# Patient Record
Sex: FEMALE | Race: WHITE
[De-identification: names, ages, dates, MRNs, and addresses within clinical notes are randomized per-mention and may not be internally consistent; named-entity substitution may affect disease eponyms.]

---

## 2018-01-23 ENCOUNTER — HOSPITAL ENCOUNTER (OUTPATIENT)
Dept: HOSPITAL 17 - NEPE | Age: 68
Setting detail: OBSERVATION
Discharge: HOME | End: 2018-01-23
Attending: HOSPITALIST | Admitting: HOSPITALIST
Payer: MEDICARE

## 2018-01-23 VITALS
RESPIRATION RATE: 16 BRPM | HEART RATE: 102 BPM | DIASTOLIC BLOOD PRESSURE: 60 MMHG | OXYGEN SATURATION: 98 % | SYSTOLIC BLOOD PRESSURE: 101 MMHG

## 2018-01-23 VITALS
RESPIRATION RATE: 16 BRPM | OXYGEN SATURATION: 97 % | SYSTOLIC BLOOD PRESSURE: 113 MMHG | HEART RATE: 102 BPM | DIASTOLIC BLOOD PRESSURE: 53 MMHG

## 2018-01-23 VITALS
HEART RATE: 135 BPM | RESPIRATION RATE: 18 BRPM | DIASTOLIC BLOOD PRESSURE: 57 MMHG | SYSTOLIC BLOOD PRESSURE: 128 MMHG | OXYGEN SATURATION: 98 %

## 2018-01-23 VITALS — DIASTOLIC BLOOD PRESSURE: 55 MMHG | SYSTOLIC BLOOD PRESSURE: 94 MMHG

## 2018-01-23 VITALS
RESPIRATION RATE: 16 BRPM | TEMPERATURE: 98.7 F | SYSTOLIC BLOOD PRESSURE: 98 MMHG | DIASTOLIC BLOOD PRESSURE: 53 MMHG | HEART RATE: 54 BPM | OXYGEN SATURATION: 95 %

## 2018-01-23 VITALS — HEART RATE: 54 BPM

## 2018-01-23 VITALS
DIASTOLIC BLOOD PRESSURE: 52 MMHG | RESPIRATION RATE: 18 BRPM | HEART RATE: 63 BPM | SYSTOLIC BLOOD PRESSURE: 112 MMHG | TEMPERATURE: 98.3 F | OXYGEN SATURATION: 96 %

## 2018-01-23 VITALS — WEIGHT: 180.78 LBS | HEIGHT: 66 IN | BODY MASS INDEX: 29.05 KG/M2

## 2018-01-23 VITALS
SYSTOLIC BLOOD PRESSURE: 95 MMHG | RESPIRATION RATE: 18 BRPM | HEART RATE: 102 BPM | DIASTOLIC BLOOD PRESSURE: 67 MMHG | OXYGEN SATURATION: 98 %

## 2018-01-23 VITALS
OXYGEN SATURATION: 97 % | DIASTOLIC BLOOD PRESSURE: 58 MMHG | RESPIRATION RATE: 16 BRPM | HEART RATE: 66 BPM | TEMPERATURE: 98.9 F | SYSTOLIC BLOOD PRESSURE: 125 MMHG

## 2018-01-23 VITALS
HEART RATE: 148 BPM | RESPIRATION RATE: 15 BRPM | DIASTOLIC BLOOD PRESSURE: 67 MMHG | SYSTOLIC BLOOD PRESSURE: 112 MMHG | TEMPERATURE: 97.8 F

## 2018-01-23 VITALS — OXYGEN SATURATION: 96 % | RESPIRATION RATE: 20 BRPM | HEART RATE: 152 BPM

## 2018-01-23 VITALS — DIASTOLIC BLOOD PRESSURE: 50 MMHG | SYSTOLIC BLOOD PRESSURE: 95 MMHG

## 2018-01-23 VITALS
HEART RATE: 113 BPM | RESPIRATION RATE: 19 BRPM | SYSTOLIC BLOOD PRESSURE: 106 MMHG | DIASTOLIC BLOOD PRESSURE: 53 MMHG | OXYGEN SATURATION: 98 %

## 2018-01-23 VITALS
HEART RATE: 88 BPM | OXYGEN SATURATION: 98 % | RESPIRATION RATE: 15 BRPM | DIASTOLIC BLOOD PRESSURE: 54 MMHG | SYSTOLIC BLOOD PRESSURE: 89 MMHG

## 2018-01-23 VITALS — HEART RATE: 60 BPM

## 2018-01-23 VITALS — DIASTOLIC BLOOD PRESSURE: 60 MMHG | SYSTOLIC BLOOD PRESSURE: 92 MMHG

## 2018-01-23 VITALS — OXYGEN SATURATION: 98 % | RESPIRATION RATE: 15 BRPM

## 2018-01-23 DIAGNOSIS — N17.9: ICD-10-CM

## 2018-01-23 DIAGNOSIS — I10: ICD-10-CM

## 2018-01-23 DIAGNOSIS — I48.0: Primary | ICD-10-CM

## 2018-01-23 DIAGNOSIS — J39.8: ICD-10-CM

## 2018-01-23 DIAGNOSIS — I25.10: ICD-10-CM

## 2018-01-23 DIAGNOSIS — I62.9: ICD-10-CM

## 2018-01-23 LAB
BASOPHILS # BLD AUTO: 0.1 TH/MM3 (ref 0–0.2)
BASOPHILS NFR BLD: 0.8 % (ref 0–2)
BUN SERPL-MCNC: 18 MG/DL (ref 7–18)
CALCIUM SERPL-MCNC: 8.3 MG/DL (ref 8.5–10.1)
CHLORIDE SERPL-SCNC: 107 MEQ/L (ref 98–107)
CREAT SERPL-MCNC: 1.05 MG/DL (ref 0.5–1)
EOSINOPHIL # BLD: 0 TH/MM3 (ref 0–0.4)
EOSINOPHIL NFR BLD: 0.6 % (ref 0–4)
ERYTHROCYTE [DISTWIDTH] IN BLOOD BY AUTOMATED COUNT: 13.2 % (ref 11.6–17.2)
GFR SERPLBLD BASED ON 1.73 SQ M-ARVRAT: 52 ML/MIN (ref 89–?)
GLUCOSE SERPL-MCNC: 130 MG/DL (ref 74–106)
HCO3 BLD-SCNC: 20.7 MEQ/L (ref 21–32)
HCT VFR BLD CALC: 40 % (ref 35–46)
HGB BLD-MCNC: 14.3 GM/DL (ref 11.6–15.3)
INR PPP: 1.1 RATIO
LYMPHOCYTES # BLD AUTO: 1.9 TH/MM3 (ref 1–4.8)
LYMPHOCYTES NFR BLD AUTO: 29.4 % (ref 9–44)
MAGNESIUM SERPL-MCNC: 2.1 MG/DL (ref 1.5–2.5)
MCH RBC QN AUTO: 31.1 PG (ref 27–34)
MCHC RBC AUTO-ENTMCNC: 35.7 % (ref 32–36)
MCV RBC AUTO: 87.1 FL (ref 80–100)
MONOCYTE #: 0.9 TH/MM3 (ref 0–0.9)
MONOCYTES NFR BLD: 14.5 % (ref 0–8)
NEUTROPHILS # BLD AUTO: 3.5 TH/MM3 (ref 1.8–7.7)
NEUTROPHILS NFR BLD AUTO: 54.7 % (ref 16–70)
PLATELET # BLD: 288 TH/MM3 (ref 150–450)
PMV BLD AUTO: 7.6 FL (ref 7–11)
PROTHROMBIN TIME: 10.7 SEC (ref 9.8–11.6)
RBC # BLD AUTO: 4.59 MIL/MM3 (ref 4–5.3)
SODIUM SERPL-SCNC: 137 MEQ/L (ref 136–145)
TROPONIN I SERPL-MCNC: (no result) NG/ML (ref 0.02–0.05)
WBC # BLD AUTO: 6.4 TH/MM3 (ref 4–11)

## 2018-01-23 PROCEDURE — 84443 ASSAY THYROID STIM HORMONE: CPT

## 2018-01-23 PROCEDURE — 83735 ASSAY OF MAGNESIUM: CPT

## 2018-01-23 PROCEDURE — 85610 PROTHROMBIN TIME: CPT

## 2018-01-23 PROCEDURE — 96376 TX/PRO/DX INJ SAME DRUG ADON: CPT

## 2018-01-23 PROCEDURE — 82552 ASSAY OF CPK IN BLOOD: CPT

## 2018-01-23 PROCEDURE — 85730 THROMBOPLASTIN TIME PARTIAL: CPT

## 2018-01-23 PROCEDURE — 84484 ASSAY OF TROPONIN QUANT: CPT

## 2018-01-23 PROCEDURE — 85025 COMPLETE CBC W/AUTO DIFF WBC: CPT

## 2018-01-23 PROCEDURE — 96374 THER/PROPH/DIAG INJ IV PUSH: CPT

## 2018-01-23 PROCEDURE — 99291 CRITICAL CARE FIRST HOUR: CPT

## 2018-01-23 PROCEDURE — 80048 BASIC METABOLIC PNL TOTAL CA: CPT

## 2018-01-23 PROCEDURE — 93005 ELECTROCARDIOGRAM TRACING: CPT

## 2018-01-23 PROCEDURE — 82550 ASSAY OF CK (CPK): CPT

## 2018-01-23 PROCEDURE — 71045 X-RAY EXAM CHEST 1 VIEW: CPT

## 2018-01-23 PROCEDURE — G0378 HOSPITAL OBSERVATION PER HR: HCPCS

## 2018-01-23 PROCEDURE — 96375 TX/PRO/DX INJ NEW DRUG ADDON: CPT

## 2018-01-23 NOTE — HHI.HP
__________________________________________________





[facility]


Service


[facility]


Primary Care Physician


Newton Wong MD


Admission Diagnosis





A. fib with RVR


Diagnoses:  


Travel History


International Travel<30 Days:  No


Contact w/Intl Traveler <30 Da:  No


Traveled to Known Affected Are:  No


History of Present Illness


67-year-old female with a past medical history significant for hypertension and 

atrial fibrillation (not on anticoagulation secondary to intracranial bleed 2 

years ago while on Coumadin) presents to the emergency department for 

evaluation of palpitations.  The patient reports that she awoke with a pounding 

in her chest and she felt as though her heart was beating irregularly.  She 

denies any chest pain or shortness of breath.  She does state that she has had 

upper respiratory symptoms including a fever to 101 and a nonproductive cough.  

She reports taking Coricidin last night prior to going to bed.  EKG was 

significant for A. fib with RVR, with a pulse of 148.  Vital signs: Temperature 

97.8, pulse 152, respirations 20, /67, pulse ox 96% on room air.





Review of Systems


Positive fever


Denies blurry vision, otorrhea, rhinorrhea 


Denies sore throat, positive dry cough


No chest pain, positive palpitations


No shortness of breath or wheezing


No abdominal pain


Denies constipation/diarrhea/nausea/vomiting


Denies muscle pain


Denies focal weakness


No rashes





Past Family Social History


Past Medical History


Atrial fibrillation


History of intracranial bleed


Hypertension


Past Surgical History


Craniectomy


 3


Reported Medications





Reported Meds & Active Scripts


Active


Reported


Calcium (Calcium Carbonate) 600 Mg Calcium (1500 Mg) Tab   


Vitamin D-1000 (Cholecalciferol) 1,000 Unit Tab 1,000 Units PO DAILY


Lisinopril 10 Mg Tab 10 Mg PO DAILY


Aspirin EC (Aspirin) 81 Mg Tabdr 81 Mg PO DAILY


Allergies:  


Coded Allergies:  


     codeine (Unverified  Allergy, Severe, Anaphylaxis, 18)


     diltiazem (Unverified  Allergy, Severe, Hives, 18)


Family History


Father with CAD


Social History


Denies tobacco.  Occasional alcohol.  Denies illicit drugs.





Physical Exam


Vital Signs





Vital Signs








  Date Time  Temp Pulse Resp B/P (MAP) Pulse Ox O2 Delivery O2 Flow Rate FiO2


 


18 04:01        


 


18 03:21  102 16 113/53 (73) 97 Nasal Cannula 2.00 


 


18 02:30  102 16 101/60 (74) 98 Nasal Cannula 2.00 


 


18 02:15  102 18 95/67 (76) 98 Nasal Cannula 2.00 


 


18 02:00  88 15 89/54 (66) 98 Nasal Cannula 2.00 


 


18 01:47  113 19 106/53 (70) 98 Nasal Cannula 2.00 


 


18 01:45  135 18 128/57 (80) 98 Nasal Cannula 2.00 


 


18 01:34   15  98 Nasal Cannula 2.00 


 


18 01:34     98 Nasal Cannula 2.00 


 


18 01:17     96 Room Air  


 


18 01:11 97.8 148 15 112/67 (82)    


 


18 01:08  152 20  96 Room Air  








Physical Exam


GENERAL:  female lying in bed


SKIN: No rashes, ecchymoses or lesions. Cool and dry.


HEAD: Atraumatic. Normocephalic. No temporal or scalp tenderness.


EYES: Pupils equal round and reactive. Extraocular motions intact. No scleral 

icterus. No injection or drainage. 


ENT: Nose without bleeding, purulent drainage or septal hematoma. Throat 

without erythema, tonsillar hypertrophy or exudate. Uvula midline. Airway 

patent.


NECK: Trachea midline. No JVD or lymphadenopathy. Supple, nontender, no 

meningeal signs.


CARDIOVASCULAR: Tachycardic.  Irregularly irregular rhythm without murmurs, 

gallops, or rubs. 


RESPIRATORY: Clear to auscultation. Breath sounds equal bilaterally. No wheezes

, rales, or rhonchi.  


GASTROINTESTINAL: Abdomen soft, non-tender, nondistended. No hepato-splenomegaly

, or palpable masses. No guarding.


MUSCULOSKELETAL: Extremities without clubbing, cyanosis, or edema. No joint 

tenderness, effusion, or edema noted. No calf tenderness. 


NEUROLOGICAL: Awake and alert. Cranial nerves II through XII intact.  Motor and 

sensory grossly within normal limits. Normal speech.


Laboratory





Laboratory Tests








Test


  18


01:15


 


White Blood Count 6.4 


 


Red Blood Count 4.59 


 


Hemoglobin 14.3 


 


Hematocrit 40.0 


 


Mean Corpuscular Volume 87.1 


 


Mean Corpuscular Hemoglobin 31.1 


 


Mean Corpuscular Hemoglobin


Concent 35.7 


 


 


Red Cell Distribution Width 13.2 


 


Platelet Count 288 


 


Mean Platelet Volume 7.6 


 


Neutrophils (%) (Auto) 54.7 


 


Lymphocytes (%) (Auto) 29.4 


 


Monocytes (%) (Auto) 14.5 


 


Eosinophils (%) (Auto) 0.6 


 


Basophils (%) (Auto) 0.8 


 


Neutrophils # (Auto) 3.5 


 


Lymphocytes # (Auto) 1.9 


 


Monocytes # (Auto) 0.9 


 


Eosinophils # (Auto) 0.0 


 


Basophils # (Auto) 0.1 


 


CBC Comment DIFF FINAL 


 


Differential Comment  


 


Prothrombin Time 10.7 


 


Prothromb Time International


Ratio 1.1 


 


 


Activated Partial


Thromboplast Time 26.0 


 


 


Blood Urea Nitrogen 18 


 


Creatinine 1.05 


 


Random Glucose 130 


 


Calcium Level 8.3 


 


Magnesium Level 2.1 


 


Sodium Level 137 


 


Potassium Level 4.5 


 


Chloride Level 107 


 


Carbon Dioxide Level 20.7 


 


Anion Gap 9 


 


Estimat Glomerular Filtration


Rate 52 


 


 


Total Creatine Kinase 292 


 


Creatine Kinase MB 0.7 


 


Creatine Kinase MB % 0.2 


 


Troponin I LESS THAN 0.02 








Result Diagram:  


18








Caprini VTE Risk Assessment


Caprini VTE Risk Assessment:  Mod/High Risk (score >= 2)


Caprini Risk Assessment Model











 Point Value = 1          Point Value = 2  Point Value = 3  Point Value = 5


 


Age 41-60


Minor surgery


BMI > 25 kg/m2


Swollen legs


Varicose veins


Pregnancy or postpartum


History of unexplained or recurrent


   spontaneous 


Oral contraceptives or hormone


   replacement


Sepsis (< 1 month)


Serious lung disease, including


   pneumonia (< 1 month)


Abnormal pulmonary function


Acute myocardial infarction


Congestive heart failure (< 1 month)


History of inflammatory bowel disease


Medical patient at bed rest Age 61-74


Arthroscopic surgery


Major open surgery (> 45 min)


Laparoscopic surgery (> 45 min)


Malignancy


Confined to bed (> 72 hours)


Immobilizing plaster cast


Central venous access Age >= 75


History of VTE


Family history of VTE


Factor V Leiden


Prothrombin 86412J


Lupus anticoagulant


Anticardiolipin antibodies


Elevated serum homocysteine


Heparin-induced thrombocytopenia


Other congenital or acquired


   thrombophilia Stroke (< 1 month)


Elective arthroplasty


Hip, pelvis, or leg fracture


Acute spinal cord injury (< 1 month)








Prophylaxis Regimen











   Total Risk


Factor Score Risk Level Prophylaxis Regimen


 


0-1      Low Early ambulation


 


2 Moderate Order ONE of the following:


*Sequential Compression Device (SCD)


*Heparin 5000 units SQ BID


 


3-4 Higher Order ONE of the following medications:


*Heparin 5000 units SQ TID


*Enoxaparin/Lovenox 40 mg SQ daily (WT < 150 kg, CrCl > 30 mL/min)


*Enoxaparin/Lovenox 30 mg SQ daily (WT < 150 kg, CrCl > 10-29 mL/min)


*Enoxaparin/Lovenox 30 mg SQ BID (WT < 150 kg, CrCl > 30 mL/min)


AND/OR


*Sequential Compression Device (SCD)


 


5 or more Highest Order ONE of the following medications:


*Heparin 5000 units SQ TID (Preferred with Epidurals)


*Enoxaparin/Lovenox 40 mg SQ daily (WT < 150 kg, CrCl > 30 mL/min)


*Enoxaparin/Lovenox 30 mg SQ daily (WT < 150 kg, CrCl > 10-29 mL/min)


*Enoxaparin/Lovenox 30 mg SQ BID (WT < 150 kg, CrCl > 30 mL/min)


AND


*Sequential Compression Device (SCD)











Assessment and Plan


Assessment and Plan


Assessment/plan:





1.  Atrial fibrillation with RVR


Patient with known history of atrial fibrillation


EKG significant for atrial fibrillation with rapid ventricular response, pulse 

48, no ST segment elevations or depressions, reviewed by me


Not a candidate for anticoagulation secondary to intracranial bleed while on 

Coumadin


Not on any rate controlling medications


Status post diltiazem, verapamil and metoprolol in the ED


Monitor with Telemetry


Cardiology consulted, appreciate recommendations.  Patient's cardiologist is 

Dr. Singh.





2.  Hypertension


Continue home lisinopril





FEN


Heart healthy diet


Electrolytes: monitor and replete prn


Holding pharmacologic anticoagulation secondary to intracranial bleed











Aura Tate MD 2018 04:13

## 2018-01-23 NOTE — MB
cc:

JULY MARIN

****

 

 

DATE OF CONSULTATION

2018

 

HISTORY OF THE PRESENT ILLNESS

A 67-year-old white female, a patient of Dr. Singh with a history of

paroxysmal atrial fibrillation.  She is not on anticoagulation secondary to

increasing cranial bleed two years ago while she was on Coumadin. She recently

had upper  respiratory infection with fevers and cough.  She took Coricidin HC

last night and woke up early this morning with palpitations and tachycardia.

She was diagnosed with atrial fibrillation.  She spontaneously converted to

sinus rhythm.  She denies any chest pain, shortness of breath, PND, orthopnea,

peripheral edema.  She had mild dizziness when she was in atrial fibrillation

but is now asymptomatic.

 

PAST MEDICAL HISTORY

Positive for:

1. Atrial fibrillation.

2. Intracranial bleed on warfarin.

3. Hypertension.

4. History of craniectomy.

5.  section x3.

 

MEDICATIONS

Include:

1. Aspirin.

2. Lisinopril.

3. Vitamin D.

4. Calcium.

5. The patient was given Flecainide pill in the pocket.

 

SOCIAL HISTORY

The patient does not smoke.  She has drinks alcohol socially.  She is

accompanied by her son.

 

FAMILY HISTORY

Positive for heart disease in her father and her sister.

 

REVIEW OF SYSTEMS

Otherwise negative.

 

PHYSICAL EXAMINATION

VITAL SIGNS: Blood pressure 125/58, pulse 66 and regular.

HEENT:  Negative.

NECK: 2+ carotid upstrokes. No bruits.

LUNGS: Clear.

HEART: Regular with no murmur, gallop or rub.

ABDOMEN: Soft. No bruits.

EXTREMITIES: Without edema.  2+ distal pulses.

NEUROLOGIC:  Examination grossly nonfocal.

 

EKG was reviewed and showed atrial fibrillation with rapid ventricular

response, left axis, left anterior fascicular block.

Telemetry now shows sinus rhythm.

 

LABORATORY DATA

Hemoglobin 14.3.

 

Potassium 4.5, creatinine 1.05.

 

.  CK-MB and troponin normal.

 

TSH 1.25.

 

DIAGNOSES

1. Paroxysmal atrial fibrillation.

2. Upper respiratory infection.

3. Hypertension.

4. History of intracranial bleed.

 

DISPOSITION

Ms. Peterson has atrial fibrillation which was possibly related to cold 
medications

(Coricidin).  She spontaneously converted to sinus rhythm.  She remains stable.

She was mildly dehydrated and was given IV fluids.  She can be discharged home

later this afternoon.  She will follow up with Dr. Singh, her primary

cardiologist, in his office after discharge.

 

 

 

                              _________________________________

                              MD BONIFACIO Parker

D:  2018/3:21 PM

T:  2018/3:45 PM

Visit #:  [id number]

Job #:  [id number]

[email or web address]

## 2018-01-23 NOTE — EKG
Date Performed: 01/23/2018       Time Performed: 01:13:16

 

PTAGE:      67 years

 

EKG:      ATRIAL FIBRILLATION WITH RAPID VENTRICULAR RESPONSE MARKED LEFT AXIS DEVIATION PATTERN CONS
ISTENT WITH PULMONARY DISEASE INCOMPLETE RIGHT BUNDLE BRANCH BLOCK VOLTAGE CRITERIA FOR LVH MODERATE 
ST DEPRESSION ABNORMAL ECG

 

PREVIOUS TRACING       : 11/21/2015 13.03

 

DOCTOR:   Andrea Hudson  Interpretating Date/Time  01/23/2018 17:24:37

## 2018-01-23 NOTE — PD
HPI


Chief Complaint:  Cardiac Complaint


Time Seen by Provider:  01:18


Travel History


International Travel<30 days:  No


Contact w/Intl Traveler<30days:  No


Traveled to known affect area:  No





History of Present Illness


HPI


67-year-old female came to the emergency room with her  with history of 

palpitations and lightheadedness that started hour and a half prior to coming 

to the emergency room.  Patient says that she took a dose of course eaten for 

her cough and congestion and soon after she started feeling like this.  Patient 

has history of atrial fibrillation and her heart rate in triage was in 140s.  

Currently she is not on any blood thinners.  Her cardiologist is Dr. Singh.

  She denies of any chest pain.  Blood pressure was in the 1 teens systolic.





On license of [facility]


Past Medical History


*** Narrative Medical


History of her past medical, surgical, social and family history is reviewed 

from the nursing note.


Hx Anticoagulant Therapy:  Yes (coumadin on hold)


Blood Disorders:  No


Depression:  Yes


Cardiovascular Problems:  Yes


Chemotherapy:  No


Cerebrovascular Accident:  Yes ( denies)


Coronary Artery Disease:  Yes (ATRIAL FIB)


Diabetes:  No


Diminished Hearing:  No


Endocrine:  No


Genitourinary:  No


Hypertension:  Yes


Immune Disorder:  No


Musculoskeletal:  Yes


Neurologic:  No


Psychiatric:  Yes


Reproductive:  No


Respiratory:  No


Tetanus Vaccination:  Never Vaccinated


Influenza Vaccination:  No


Menopausal:  Yes





Past Surgical History


 Section:  Yes (X 3)


Gynecologic Surgery:  Yes (C SECTION, BREAST BIOPSY)


Hysterectomy:  No


Other Surgery:  Yes (LEFT BREAST BX)





Social History


Alcohol Use:  Yes (OCCASIONAL)


Tobacco Use:  No


Substance Use:  No





Allergies-Medications


(Allergen,Severity, Reaction):  


Coded Allergies:  


     codeine (Unverified  Allergy, Severe, Anaphylaxis, 18)


     diltiazem (Unverified  Allergy, Severe, Hives, 18)


Comments


List of her allergies reviewed from the nursing note.


Reported Meds & Prescriptions





Reported Meds & Active Scripts


Active


Reported


Calcium (Calcium Carbonate) 600 Mg Calcium (1500 Mg) Tab   


Vitamin D-1000 (Cholecalciferol) 1,000 Unit Tab 1,000 Units PO DAILY


Lisinopril 10 Mg Tab 10 Mg PO DAILY


Aspirin EC (Aspirin) 81 Mg Tabdr 81 Mg PO DAILY





Narrative Medication


List of her home medications reviewed from the nursing note.





Review of Systems


Except as stated in HPI:  all other systems reviewed are Neg


Cardiovascular:  Positive: Palpitations, Tachycardia


Neurologic:  Positive: Dizziness





Physical Exam


Narrative


GENERAL: Awake, alert, moderate distress


SKIN: Focused skin assessment warm/dry.  Pale


HEAD: Atraumatic. Normocephalic. 


EYES: Pupils equal and round. No scleral icterus. No injection or drainage. 


ENT: No nasal bleeding or discharge.  Mucous membranes pink and moist.


NECK: Trachea midline. No JVD. 


CARDIOVASCULAR: Irregularly irregular rhythm, tachycardia.  No murmur 

appreciated.


RESPIRATORY: No accessory muscle use. Clear to auscultation. Breath sounds 

equal bilaterally. 


GASTROINTESTINAL: Abdomen soft, non-tender, nondistended. Hepatic and splenic 

margins not palpable. 


MUSCULOSKELETAL: No obvious deformities. No clubbing.  No cyanosis.  No edema. 


NEUROLOGICAL: Awake and alert. No obvious cranial nerve deficits.  Motor 

grossly within normal limits. Normal speech.


PSYCHIATRIC: Appropriate mood and affect; insight and judgment normal.





Data


Data


Last Documented VS





Vital Signs








  Date Time  Temp Pulse Resp B/P (MAP) Pulse Ox O2 Delivery O2 Flow Rate FiO2


 


18 02:30  102 16 101/60 (74) 98 Nasal Cannula 2.00 


 


18 01:11 97.8       








Orders





 Orders


Diltiazem Inj (Cardizem Inj) (18 01:16)


Metoprolol Tartrate Inj (Lopressor Inj) (18 01:30)


Electrocardiogram (18 01:19)


Basic Metabolic Panel (Bmp) (18 01:19)


Ckmb (Isoenzyme) Profile (18 01:19)


Complete Blood Count With Diff (18 01:19)


Magnesium (Mg) (18 01:19)


Prothrombin Time / Inr (Pt) (18 01:19)


Act Partial Throm Time (Ptt) (18 01:19)


Troponin I (18 01:19)


Chest, Single Ap (18:19)


Ecg Monitoring (18:19)


Bilateral Bp Monitoring (18:19)


Iv Access Insert/Monitor (18 01:19)


Oximetry (18 01:19)


Oxygen Administration (18 01:19)


Sodium Chloride 0.9% Flush (Ns Flush) (18 01:30)


Verapamil Inj (Isoptin Inj) (18 01:30)


CKMB (18 01:15)


CKMB% (18 01:15)


Admit Order (Ed Use Only) (18 02:33)


Place In Observation (18 )


Vital Signs (Adult) Q4H (18 03:03)


Activity Oob With Assistance (18 03:03)


Cardiac Monitor / Telemetry .CONTINUOUS (18 03:03)


Intake + Output ZANDER.QSHIFT (18 03:03)


Diet Heart Healthy (18 Breakfast)


Sodium Chloride 0.9% Flush (Ns Flush) (18 03:15)


Sodium Chloride 0.9% Flush (Ns Flush) (18 09:00)


Acetaminophen (Tylenol) (18 03:15)


Naloxone Inj (Narcan Inj) (18 03:15)


Scd&Teds Bilateral/Knee High ZANDER.QSHIFT (18 03:03)


Consult Cardiology (18 )





Labs





Laboratory Tests








Test


  18


01:15


 


White Blood Count 6.4 TH/MM3 


 


Red Blood Count 4.59 MIL/MM3 


 


Hemoglobin 14.3 GM/DL 


 


Hematocrit 40.0 % 


 


Mean Corpuscular Volume 87.1 FL 


 


Mean Corpuscular Hemoglobin 31.1 PG 


 


Mean Corpuscular Hemoglobin


Concent 35.7 % 


 


 


Red Cell Distribution Width 13.2 % 


 


Platelet Count 288 TH/MM3 


 


Mean Platelet Volume 7.6 FL 


 


Neutrophils (%) (Auto) 54.7 % 


 


Lymphocytes (%) (Auto) 29.4 % 


 


Monocytes (%) (Auto) 14.5 % 


 


Eosinophils (%) (Auto) 0.6 % 


 


Basophils (%) (Auto) 0.8 % 


 


Neutrophils # (Auto) 3.5 TH/MM3 


 


Lymphocytes # (Auto) 1.9 TH/MM3 


 


Monocytes # (Auto) 0.9 TH/MM3 


 


Eosinophils # (Auto) 0.0 TH/MM3 


 


Basophils # (Auto) 0.1 TH/MM3 


 


CBC Comment DIFF FINAL 


 


Differential Comment  


 


Prothrombin Time 10.7 SEC 


 


Prothromb Time International


Ratio 1.1 RATIO 


 


 


Activated Partial


Thromboplast Time 26.0 SEC 


 


 


Blood Urea Nitrogen 18 MG/DL 


 


Creatinine 1.05 MG/DL 


 


Random Glucose 130 MG/DL 


 


Calcium Level 8.3 MG/DL 


 


Magnesium Level 2.1 MG/DL 


 


Sodium Level 137 MEQ/L 


 


Potassium Level 4.5 MEQ/L 


 


Chloride Level 107 MEQ/L 


 


Carbon Dioxide Level 20.7 MEQ/L 


 


Anion Gap 9 MEQ/L 


 


Estimat Glomerular Filtration


Rate 52 ML/MIN 


 


 


Total Creatine Kinase 292 U/L 


 


Creatine Kinase MB 0.7 NG/ML 


 


Creatine Kinase MB % 0.2 % 


 


Troponin I


  LESS THAN 0.02


NG/ML


 


Thyroid Stimulating Hormone


3rd Gen 1.250 uIU/ML 


 











MDM


Medical Decision Making


Medical Screen Exam Complete:  Yes


Emergency Medical Condition:  Yes


Medical Record Reviewed:  Yes


Interpretation(s)


Twelve-lead EKG was reviewed by me.  A. fib, left axis deviation, RVR.  Heart 

rate of 148 bpm.


Differential Diagnosis


A. fib with RVR, Coricidin induced


Narrative Course


2:36 AM patient was given 2 doses of IV metoprolol 5 mg each since she is 

allergic to Cardizem.  After each dose a heart rate remained in 120s to 130s 

and would jump back to 140s.  At this point I looked back into her medical 

record and noticed that in  when patient had A. fib she was given verapamil 

with success.  I ordered 5 mg of IV verapamil which after getting patient's 

heart rate immediately came down under 100s.  Patient currently is not 

anticoagulated and had a history of head bleed.  She was told by her 

cardiologist not to be on anticoagulant after that.  Her repeat EKG continues 

to show A. fib.  At this point I decided to admit her so that she can be seen 

in the morning by the cardiologist.  I spoke with the hospitalist has accepted 

the case.  I discussed this with the patient and her  and they are okay 

with the plan.


Critical Care Narrative


Aggregate critical care time was 45 minutes. Time to perform other separately 

billable procedures was not  


included in the critical care time. My time did not include minutes spent 

treating any other patients simultaneously or on  


activities that did not directly contribute to the patient's treatment.  





The services I provided to this patient were to treat and/or prevent clinically 

significant deterioration that could result  


in: A. fib with RVR, rate control





I provided critical care services requiring my management, as noted below:


Chart data review, documentation time, medication orders and management, vital 

sign assessments/reviewing monitor data,  


ordering and reviewing lab tests, ordering and interpreting/reviewing x-rays 

and diagnostic studies, care of the patient  


and discussion of the patient with the admitting physicians.





Procedures


EKG Prior to Arrival:  No





Diagnosis





 Primary Impression:  


 Atrial fibrillation with RVR


 Additional Impression:  


 Persistent atrial fibrillation





Admitting Information


Admitting Physician Requests:  Observation











Yasmeen Stevenson MD 2018 02:38

## 2018-01-23 NOTE — HHI.DCPOC
Discharge Care Plan


Diagnosis:  


(1) Atrial fibrillation with RVR


Goals to Promote Your Health


* To prevent worsening of your condition and complications


* To maintain your health at the optimal level


Directions to Meet Your Goals


*** Take your medications as prescribed


*** Follow your dietary instruction


*** Follow activity as directed








*** Keep your appointments as scheduled


*** Take your immunizations and boosters as scheduled


*** If your symptoms worsen call your PCP, if no PCP go to Urgent Care Center 

or Emergency Room***


*** Smoking is Dangerous to Your Health. Avoid second hand smoke***


***Call the 24-hour hour crisis hotline for domestic abuse at 1-399.155.3855***











Sulma Soares PA-C Jan 23, 2018 15:49

## 2018-01-23 NOTE — HHI.PR
Subjective


Remarks


Follow-up A. fib with RVR.  Converted to sinus rhythm.  She feels okay without 

complaints.  Reports she took OTC medication obtaining dexamethasone.  

Discussed with RN





Objective


Vitals





Vital Signs








  Date Time  Temp Pulse Resp B/P (MAP) Pulse Ox O2 Delivery O2 Flow Rate FiO2


 


1/23/18 11:25 98.3 63 18 112/52 (72) 96   





    Manual Cuff/Auscultation    


 


1/23/18 08:16 98.7 54 16 98/53 (68) 95   


 


1/23/18 07:00    94/55 (68)    


 


1/23/18 06:45    95/50 (65)    


 


1/23/18 06:40  60      


 


1/23/18 06:10    92/60 (71)    


 


1/23/18 04:01        


 


1/23/18 03:21  102 16 113/53 (73) 97 Nasal Cannula 2.00 


 


1/23/18 02:30  102 16 101/60 (74) 98 Nasal Cannula 2.00 


 


1/23/18 02:15  102 18 95/67 (76) 98 Nasal Cannula 2.00 


 


1/23/18 02:00  88 15 89/54 (66) 98 Nasal Cannula 2.00 


 


1/23/18 01:47  113 19 106/53 (70) 98 Nasal Cannula 2.00 


 


1/23/18 01:45  135 18 128/57 (80) 98 Nasal Cannula 2.00 


 


1/23/18 01:34   15  98 Nasal Cannula 2.00 


 


1/23/18 01:34     98 Nasal Cannula 2.00 


 


1/23/18 01:17     96 Room Air  


 


1/23/18 01:11 97.8 148 15 112/67 (82)    


 


1/23/18 01:08  152 20  96 Room Air  








Result Diagram:  


1/23/18 0115                                                                   

             1/23/18 0115





Imaging





Last Impressions








Chest X-Ray 1/23/18 0119 Signed





Impressions: 





 Service Date/Time:  Tuesday, January 23, 2018 01:35 - CONCLUSION: The lungs 

are 





 clear.     Joaquín Wilson MD 








Objective Remarks


GENERAL:  female lying in bed


SKIN: No rashes, ecchymoses or lesions. Cool and dry.


CARDIOVASCULAR: Regular rate and rhythm without murmurs, gallops, or rubs. 


RESPIRATORY: Clear to auscultation. Breath sounds equal bilaterally. No wheezes

, rales, or rhonchi.  


GASTROINTESTINAL: Abdomen soft, non-tender, nondistended. No guarding.


MUSCULOSKELETAL: Extremities without clubbing, cyanosis, or edema. No joint 

tenderness, effusion, or edema noted. No calf tenderness. 


NEUROLOGICAL: Awake and alert. Cranial nerves II through XII intact.  Motor and 

sensory grossly within normal limits. Normal speech.


Procedures


None





A/P


Problem List:  


(1) Atrial fibrillation with RVR


ICD Code:  I48.91 - Unspecified atrial fibrillation


Status:  Chronic


Assessment and Plan


1.  Atrial fibrillation with RVR


Patient with known history of atrial fibrillation


EKG significant for atrial fibrillation with rapid ventricular response, pulse 

48, no ST segment elevations or depressions, reviewed by me


Not a candidate for anticoagulation secondary to intracranial bleed while on 

Coumadin


Not on any rate controlling medications


Status post diltiazem, verapamil and metoprolol in the ED


Monitor with Telemetry


Cardiology consulted, appreciate recommendations.  Patient's cardiologist is 

Dr. Singh.


Converted to SR. RVR 2/2 , [employer].  Patient has been counseled.  She has been 

cleared for discharge by cardiology





2.  Hypertension


Continue home lisinopril





3.  Acute kidney injury.  Nonoliguric.  Status post IV hydration.  Repeat BMP 

in 1 week 





4.  URI symptoms.  Patient refused flu screen





FEN


Heart healthy diet


Electrolytes: monitor and replete prn


Holding pharmacologic anticoagulation secondary to intracranial bleed


Discharge Planning


Discharge patient to home


Condition on discharge: Improved


Regular Diet as tolerated


[place] activity no driving


Rx written: None


Follow-up with primary care physician and cardiology











Brendan James MD Jan 23, 2018 13:10

## 2018-01-23 NOTE — RADRPT
EXAM DATE/TIME:  01/23/2018 01:35 

 

[facility] COMPARISON:     

CHEST SINGLE AP, November 27, 2015, 4:59.

 

                     

INDICATIONS :     

Chest pain.

                     

 

MEDICAL HISTORY :     

None.          

 

SURGICAL HISTORY :     

None.   

 

ENCOUNTER:     

Initial                                        

 

ACUITY:     

1 day      

 

PAIN SCORE:     

0/10

 

LOCATION:     

Bilateral  chest

 

FINDINGS:     

A single view of the chest demonstrates the lungs to be symmetrically aerated without evidence of mas
s, infiltrate or effusion.  The cardiomediastinal contours are unremarkable.  Stable mild curvature o
f the thoracolumbar spine convex towards the left..

 

CONCLUSION:     The lungs are clear.

 

 

 

 Joaquín Wilson MD on January 23, 2018 at 1:54           

Board Certified Radiologist.

 This report was verified electronically.

## 2018-01-23 NOTE — EKG
Date Performed: 01/23/2018       Time Performed: 02:15:16

 

PTAGE:      67 years

 

EKG:      ATRIAL FIBRILLATION WITH RAPID VENTRICULAR RESPONSE MARKED LEFT AXIS DEVIATION MODERATE INT
RAVENTRICULAR CONDUCTION DELAY MODERATE VOLTAGE CRITERIA FOR LVH, CONSIDER NORMAL VARIANT ABNORMAL EC
G

 

PREVIOUS TRACING       : 01/23/2018 01.13

 

DOCTOR:   Andrea Hudson  Interpretating Date/Time  01/23/2018 17:24:05